# Patient Record
Sex: MALE | Race: OTHER
[De-identification: names, ages, dates, MRNs, and addresses within clinical notes are randomized per-mention and may not be internally consistent; named-entity substitution may affect disease eponyms.]

---

## 2018-05-12 ENCOUNTER — HOSPITAL ENCOUNTER (EMERGENCY)
Dept: HOSPITAL 54 - ER | Age: 33
Discharge: HOME | End: 2018-05-12
Payer: COMMERCIAL

## 2018-05-12 VITALS — SYSTOLIC BLOOD PRESSURE: 154 MMHG | DIASTOLIC BLOOD PRESSURE: 104 MMHG

## 2018-05-12 VITALS — BODY MASS INDEX: 29.73 KG/M2 | HEIGHT: 66 IN | WEIGHT: 185 LBS

## 2018-05-12 DIAGNOSIS — F15.10: ICD-10-CM

## 2018-05-12 DIAGNOSIS — Z02.89: Primary | ICD-10-CM

## 2018-05-12 PROCEDURE — Z7610: HCPCS

## 2018-05-12 PROCEDURE — A4606 OXYGEN PROBE USED W OXIMETER: HCPCS

## 2018-05-12 NOTE — NUR
Patient discharged UNDER CUSTODY in stable condition. Written and verbal after 
care instructions given. Patient verbalizes understanding of instruction. 
REMAINS TACHY HOWEVER PATIENT REFUSED ANY FURTHER TREATMENT.